# Patient Record
Sex: FEMALE | Race: OTHER | NOT HISPANIC OR LATINO | URBAN - METROPOLITAN AREA
[De-identification: names, ages, dates, MRNs, and addresses within clinical notes are randomized per-mention and may not be internally consistent; named-entity substitution may affect disease eponyms.]

---

## 2022-04-20 ENCOUNTER — OUTPATIENT (OUTPATIENT)
Dept: OUTPATIENT SERVICES | Facility: HOSPITAL | Age: 67
LOS: 1 days | End: 2022-04-20
Payer: COMMERCIAL

## 2022-04-20 VITALS
HEIGHT: 62 IN | SYSTOLIC BLOOD PRESSURE: 136 MMHG | TEMPERATURE: 98 F | HEART RATE: 69 BPM | DIASTOLIC BLOOD PRESSURE: 60 MMHG | RESPIRATION RATE: 12 BRPM | OXYGEN SATURATION: 100 % | WEIGHT: 134.92 LBS

## 2022-04-20 VITALS
HEART RATE: 70 BPM | SYSTOLIC BLOOD PRESSURE: 130 MMHG | OXYGEN SATURATION: 98 % | RESPIRATION RATE: 12 BRPM | DIASTOLIC BLOOD PRESSURE: 75 MMHG

## 2022-04-20 DIAGNOSIS — H50.15 ALTERNATING EXOTROPIA: ICD-10-CM

## 2022-04-20 LAB
ANION GAP SERPL CALC-SCNC: 11 MMOL/L — SIGNIFICANT CHANGE UP (ref 5–17)
BUN SERPL-MCNC: 46 MG/DL — HIGH (ref 7–23)
CALCIUM SERPL-MCNC: 9.2 MG/DL — SIGNIFICANT CHANGE UP (ref 8.4–10.5)
CHLORIDE SERPL-SCNC: 108 MMOL/L — SIGNIFICANT CHANGE UP (ref 96–108)
CO2 SERPL-SCNC: 19 MMOL/L — LOW (ref 22–31)
CREAT SERPL-MCNC: 2.11 MG/DL — HIGH (ref 0.5–1.3)
EGFR: 26 ML/MIN/1.73M2 — LOW
GLUCOSE SERPL-MCNC: 112 MG/DL — HIGH (ref 70–99)
POTASSIUM SERPL-MCNC: 5.1 MMOL/L — SIGNIFICANT CHANGE UP (ref 3.5–5.3)
POTASSIUM SERPL-SCNC: 5.1 MMOL/L — SIGNIFICANT CHANGE UP (ref 3.5–5.3)
SODIUM SERPL-SCNC: 138 MMOL/L — SIGNIFICANT CHANGE UP (ref 135–145)

## 2022-04-20 PROCEDURE — 36415 COLL VENOUS BLD VENIPUNCTURE: CPT

## 2022-04-20 PROCEDURE — 67312 REVISE TWO EYE MUSCLES: CPT | Mod: LT

## 2022-04-20 PROCEDURE — 80048 BASIC METABOLIC PNL TOTAL CA: CPT

## 2022-04-20 PROCEDURE — ZZZZZ: CPT

## 2022-04-20 NOTE — ASU PATIENT PROFILE, ADULT - BLOOD TRANSFUSION, PREVIOUS, PROFILE
Patient here for a B/P nurse visit. She brought her cuff from home because she was not sure if she was using it corectly. I educated her on how to use it and she verbalized understanding. Her cuff read 165/93, taken on her right arm at 1135. I took it manually at 1145, from her left arm and the reading was 156/88. Will send note to Dr Garg.   no

## 2022-04-20 NOTE — ASU PATIENT PROFILE, ADULT - FALL HARM RISK - UNIVERSAL INTERVENTIONS
Bed in lowest position, wheels locked, appropriate side rails in place/Call bell, personal items and telephone in reach/Instruct patient to call for assistance before getting out of bed or chair/Non-slip footwear when patient is out of bed/[place] to call system/Physically safe environment - no spills, clutter or unnecessary equipment/Purposeful Proactive Rounding/Room/bathroom lighting operational, light cord in reach

## 2022-04-20 NOTE — ASU DISCHARGE PLAN (ADULT/PEDIATRIC) - CARE PROVIDER_API CALL
Ermias Olguin)  Ophthalmology  [phone] [place] [address], [address]  [place] [address]  Phone: [phone]  Fax: [phone]  Follow Up Time: 2 weeks

## 2022-04-20 NOTE — ASU PATIENT PROFILE, ADULT - NSICDXPASTMEDICALHX_GEN_ALL_CORE_FT
PAST MEDICAL HISTORY:  CKD (chronic kidney disease)     Gastritis     HLD (hyperlipidemia)     HTN (hypertension)     Prediabetes     Proteinuria     Vitamin D deficiency

## 2022-04-20 NOTE — PRE-OP CHECKLIST - NEEDLE GAUGE
BLADDER HEALTH    WHAT IS CONSIDERED NORMAL?  The average bladder can hold about 2 cups of urine before it needs to be emptied   The normal range of voiding urine is 6 to 8 times during a 24 hour period  As we get older, our bladder capacity can get smaller and we may need to pass urine more frequently but usually not more than every 2 hours   Urine should flow easily without discomfort in a good, steady stream until the bladder is empty  No pushing or straining is necessary to empty the bladder   An urge is a signal that you feel as the bladder stretches to fill with urine  Urges can be felt even if the bladder is not full  Urges are not commands to go to the toilet, merely a signal and can be controlled  WHAT ARE GOOD BLADDER HABITS?  Take your time when emptying your bladder  Dont strain or push to empty your bladder  Make sure you empty your bladder completely each time you pass urine  Do not rush the process   Consistently ignoring the urge to go (waiting more than 4 hours between toileting) or urinating too infrequently may be convenient but not healthy for your bladder   Avoid going to the toilet just in case or more often than every 2 hours  It is usually not necessary to go when you feel the first urge  Try to go only when your bladder is full  Urgency and frequency of urination can be improved by retraining the bladder and spacing your fluid intake throughout the day  Practice good toilet habits  Dont let your bladder control your life  TIPS TO MAINTAIN GOOD BLADDER HABITS   Maintain a good fluid intake  Depending on your body size and environment, drink 6 -8 cups (8 oz each) of fluid per day unless otherwise advised by your doctor  Not enough fluid creates a foul odor and dark color of the urine     Limit the amount of caffeine (coffee, cola, chocolate or tea) and citrus foods that you consume as these foods can be associated with increased sensation of urinary urgency and frequency   Limit the amount of alcohol you drink  Alcohol increases urine production and makes it difficult for the brain to coordinate bladder control   Avoid constipation by maintaining a balanced diet of dietary fiber   Cigarette smoking is also irritating to the bladder surface and is associated with bladder cancer  In addition, the coughing associated with smoking may lead to increased incontinent episodes because of the increased pressure  HOW DIET CAN AFFECT YOUR BLADDER  Although there is no particular "diet" that can cure bladder control, there are certain dietary suggestions you can use to help control the problem  There are 2 points to consider when evaluating how your habits and diet may affect your bladder:    1  Foods and fluids can irritate the bladder  Some foods and beverages are thought to contribute to bladder leakage and irritability  However their effect on the bladder is not completely understood and you may want to see if eliminating one or all of these items improves your bladder control  If you are unable to give them up completely, it is recommended that you use the following items in moderation:   Acidic beverages and foods (orange juice, grapefruit juice, lemonade etc)   Alcoholic beverages   Vinegar   Coffee (regular and decaf)   Tea (regular and decaf)   Caffeinated beverages   Carbonated beverages          2  Drinking enough and the right kinds of fluids  Many people with bladder control issues decrease their intake of liquids in hope that they will need to urinate less frequently or have less urinary leakage   You should not restrict fluids to control your bladder  While a decrease in liquid intake does result in a decrease in the volume of urine, the smaller amount of urine may be more highly concentrated         Highly concentrated, dark yellow urine is irritating to the bladder surface and may actually cause you to go to the bathroom more frequently   It also encourages the growth of bacteria, which may lead to infections resulting in incontinence   Substitutions for Bladder Irritants: water is always the best beverage choice  Grape and apple juice are thirst quenchers are good selections and are not as irritating to the bladder   o Low acid fruits:  Pears, apricots, papaya, watermelon  o For coffee drinkers: KAVA®, Postum®, Oksana®, Kaffree [place]®  o For tea drinkers:  non-citrus or herbal and sun brewed tea  BLADDER HEALTH    WHAT IS CONSIDERED NORMAL?  The average bladder can hold about 2 cups of urine before it needs to be emptied   The normal range of voiding urine is 6 to 8 times during a 24 hour period  As we get older, our bladder capacity can get smaller and we may need to pass urine more frequently but usually not more than every 2 hours   Urine should flow easily without discomfort in a good, steady stream until the bladder is empty  No pushing or straining is necessary to empty the bladder   An urge is a signal that you feel as the bladder stretches to fill with urine  Urges can be felt even if the bladder is not full  Urges are not commands to go to the toilet, merely a signal and can be controlled  WHAT ARE GOOD BLADDER HABITS?  Take your time when emptying your bladder  Dont strain or push to empty your bladder  Make sure you empty your bladder completely each time you pass urine  Do not rush the process   Consistently ignoring the urge to go (waiting more than 4 hours between toileting) or urinating too infrequently may be convenient but not healthy for your bladder   Avoid going to the toilet just in case or more often than every 2 hours  It is usually not necessary to go when you feel the first urge  Try to go only when your bladder is full  Urgency and frequency of urination can be improved by retraining the bladder and spacing your fluid intake throughout the day   Practice good toilet habits  Dont let your bladder control your life  TIPS TO MAINTAIN GOOD BLADDER HABITS   Maintain a good fluid intake  Depending on your body size and environment, drink 6 -8 cups (8 oz each) of fluid per day unless otherwise advised by your doctor  Not enough fluid creates a foul odor and dark color of the urine   Limit the amount of caffeine (coffee, cola, chocolate or tea) and citrus foods that you consume as these foods can be associated with increased sensation of urinary urgency and frequency   Limit the amount of alcohol you drink  Alcohol increases urine production and makes it difficult for the brain to coordinate bladder control   Avoid constipation by maintaining a balanced diet of dietary fiber   Cigarette smoking is also irritating to the bladder surface and is associated with bladder cancer  In addition, the coughing associated with smoking may lead to increased incontinent episodes because of the increased pressure  HOW DIET CAN AFFECT YOUR BLADDER  Although there is no particular "diet" that can cure bladder control, there are certain dietary suggestions you can use to help control the problem  There are 2 points to consider when evaluating how your habits and diet may affect your bladder:    3  Foods and fluids can irritate the bladder  Some foods and beverages are thought to contribute to bladder leakage and irritability  However their effect on the bladder is not completely understood and you may want to see if eliminating one or all of these items improves your bladder control  If you are unable to give them up completely, it is recommended that you use the following items in moderation:   Acidic beverages and foods (orange juice, grapefruit juice, lemonade etc)   Alcoholic beverages   Vinegar   Coffee (regular and decaf)   Tea (regular and decaf)   Caffeinated beverages   Carbonated beverages          4   Drinking enough and the right kinds of fluids  Many people with bladder control issues decrease their intake of liquids in hope that they will need to urinate less frequently or have less urinary leakage   You should not restrict fluids to control your bladder  While a decrease in liquid intake does result in a decrease in the volume of urine, the smaller amount of urine may be more highly concentrated   Highly concentrated, dark yellow urine is irritating to the bladder surface and may actually cause you to go to the bathroom more frequently   It also encourages the growth of bacteria, which may lead to infections resulting in incontinence   Substitutions for Bladder Irritants: water is always the best beverage choice    Grape and apple juice are thirst quenchers are good selections and are not as irritating to the bladder   o Low acid fruits:  Pears, apricots, papaya, watermelon  o For coffee drinkers: KAVA®, Postum®, Oksana®, Kaffree [place]®  o For tea drinkers:  non-citrus or herbal and sun brewed tea 22

## 2023-06-08 NOTE — ASU PATIENT PROFILE, ADULT - ANESTHESIA, PREVIOUS REACTION, PROFILE
Last Visit Date: 4/10/2023   Next Visit Date: 7/10/2023
denies undergoing anesthesia in the past-denies family history

## 2024-09-04 NOTE — PRE-OP CHECKLIST - VERIFY SURGICAL SITE/SIDE WITH PATIENT
Pt completed vitamin D level please see result below and advise if you would like pt to continue Vitamin D prescription.       done

## (undated) DEVICE — SUT VICRYL 6-0 18" S-29 DA

## (undated) DEVICE — SYR LUER LOK 3CC

## (undated) DEVICE — WRAP COMPRESSION CALF MED

## (undated) DEVICE — PACK OPTH STRAB CUSTOM

## (undated) DEVICE — SUT VICRYL 8-0 12" TG140-8 DA

## (undated) DEVICE — SUT SOFSILK 4-0 30" CV-23

## (undated) DEVICE — SOL IRR POUR H2O 250ML

## (undated) DEVICE — GLV 6.5 PROTEXIS

## (undated) DEVICE — SOL BALANCE SALT 15ML

## (undated) DEVICE — BLANKET WARMER LOWER ADULT